# Patient Record
Sex: MALE | Race: WHITE | NOT HISPANIC OR LATINO | Employment: FULL TIME | URBAN - METROPOLITAN AREA
[De-identification: names, ages, dates, MRNs, and addresses within clinical notes are randomized per-mention and may not be internally consistent; named-entity substitution may affect disease eponyms.]

---

## 2024-03-25 ENCOUNTER — OFFICE VISIT (OUTPATIENT)
Dept: SPORTS MEDICINE | Facility: CLINIC | Age: 49
End: 2024-03-25
Payer: COMMERCIAL

## 2024-03-25 ENCOUNTER — HOSPITAL ENCOUNTER (OUTPATIENT)
Dept: RADIOLOGY | Facility: HOSPITAL | Age: 49
Discharge: HOME | End: 2024-03-25
Attending: ORTHOPAEDIC SURGERY
Payer: COMMERCIAL

## 2024-03-25 VITALS — BODY MASS INDEX: 30.31 KG/M2 | WEIGHT: 200 LBS | HEIGHT: 68 IN

## 2024-03-25 DIAGNOSIS — M25.461 EFFUSION OF BOTH KNEE JOINTS: ICD-10-CM

## 2024-03-25 DIAGNOSIS — M17.0 BILATERAL PRIMARY OSTEOARTHRITIS OF KNEE: Primary | ICD-10-CM

## 2024-03-25 DIAGNOSIS — M25.562 ACUTE PAIN OF BOTH KNEES: ICD-10-CM

## 2024-03-25 DIAGNOSIS — M25.562 PAIN IN BOTH KNEES, UNSPECIFIED CHRONICITY: ICD-10-CM

## 2024-03-25 DIAGNOSIS — M25.462 EFFUSION OF BOTH KNEE JOINTS: ICD-10-CM

## 2024-03-25 DIAGNOSIS — M25.561 PAIN IN BOTH KNEES, UNSPECIFIED CHRONICITY: ICD-10-CM

## 2024-03-25 DIAGNOSIS — M25.561 ACUTE PAIN OF BOTH KNEES: ICD-10-CM

## 2024-03-25 PROCEDURE — 99203 OFFICE O/P NEW LOW 30 MIN: CPT | Mod: 25,S$GLB,, | Performed by: ORTHOPAEDIC SURGERY

## 2024-03-25 PROCEDURE — 20610 DRAIN/INJ JOINT/BURSA W/O US: CPT | Mod: 50,S$GLB,, | Performed by: ORTHOPAEDIC SURGERY

## 2024-03-25 PROCEDURE — 3008F BODY MASS INDEX DOCD: CPT | Mod: CPTII,S$GLB,, | Performed by: ORTHOPAEDIC SURGERY

## 2024-03-25 PROCEDURE — 73564 X-RAY EXAM KNEE 4 OR MORE: CPT | Mod: TC,50,PN

## 2024-03-25 PROCEDURE — 99999 PR PBB SHADOW E&M-NEW PATIENT-LVL III: CPT | Mod: PBBFAC,,, | Performed by: ORTHOPAEDIC SURGERY

## 2024-03-25 PROCEDURE — 73564 X-RAY EXAM KNEE 4 OR MORE: CPT | Mod: 26,50,, | Performed by: RADIOLOGY

## 2024-03-25 RX ORDER — NAPROXEN 500 MG/1
500 TABLET ORAL 2 TIMES DAILY
Qty: 60 TABLET | Refills: 2 | Status: SHIPPED | OUTPATIENT
Start: 2024-03-25

## 2024-03-25 RX ADMIN — METHYLPREDNISOLONE ACETATE 80 MG: 80 INJECTION, SUSPENSION INTRA-ARTICULAR; INTRALESIONAL; INTRAMUSCULAR; SOFT TISSUE at 10:03

## 2024-03-25 NOTE — PROCEDURES
Large Joint Aspiration/Injection: bilateral knee    Date/Time: 3/25/2024 10:30 AM    Performed by: Dedrick Villanueva MD  Authorized by: Dedrick Villanueva MD    Consent Done?:  Yes (Verbal)  Indications:  Joint swelling and pain  Site marked: the procedure site was marked    Timeout: prior to procedure the correct patient, procedure, and site was verified    Prep: patient was prepped and draped in usual sterile fashion      Local anesthesia used?: Yes    Anesthesia:  Local infiltration  Local anesthetic:  Bupivacaine 0.5% without epinephrine, lidocaine 1% without epinephrine and topical anesthetic    Details:  Needle Size:  22 G  Ultrasonic Guidance for needle placement?: No    Approach:  Superior  Location:  Knee  Laterality:  Bilateral  Site:  Bilateral knee  Medications (Right):  80 mg methylPREDNISolone acetate 80 mg/mL  Medications (Left):  80 mg methylPREDNISolone acetate 80 mg/mL  Patient tolerance:  Patient tolerated the procedure well with no immediate complications     Bilateral knee injections:  2cc 1% lidocaine plain, 2cc 0.5% marcaine plain, 0.5cc 80mg methylprednisolone    Procedure Note:  We discussed the risk and benefits of injections, including pain, infection, bleeding, damage to adjacent structures, risk of reaction to injection. We discussed the steroid/cortisone injections will not heal the problem but mat help decrease inflammation and help with symptoms. We discussed the risk of repeated injections. The patient expressed understanding and wanted to proceed with the injection. We performed a timeout to verify the proper patient, proper procedure, and the proper site. The injection site was prepared in a sterile fashion. The patient tolerated it well and there were no complication. We did discuss with the patient that steroid injections can cause some increase in blood sugar and blood pressure for up to a week after the injection.

## 2024-03-25 NOTE — PROGRESS NOTES
Patient ID: Brady Leggett  YOB: 1975  MRN: [id number]    Chief Complaint: Pain of the Left Knee and Pain of the Right Knee    Referred By: Leann, previously seen Dr. 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 He reports the pain began after a  basketball game last Tuesday on 3/19/24. He denies any specific injury at the time, but symptoms onset after. He does have popping/instability in his knees. He reports the right knee is worst than his left. He reports anterior knee pain and swelling. He takes over the counter NSAIDS as needed for the pain. He has not had any physical therapy at this time. He reports his knees did bother him prior to this event but after the basketball his knees made his worst.   HPI    Past Medical History:   No past medical history on file.  No past surgical history on file.  Family History   Problem Relation Age of Onset    No Known Problems Mother     No Known Problems Father      Social History     Socioeconomic History    Marital status: Single   Tobacco Use    Smoking status: Never    Smokeless tobacco: Never       Review of patient's allergies indicates:  No Known Allergies  ROS    Physical Exam:   Body mass index is 30.41 kg/m².  There were no vitals filed for this visit.   GENERAL: Well appearing, appropriate for stated age, no acute distress.  CARDIOVASCULAR: Pulses regular by peripheral palpation.  PULMONARY: Respirations are even and non-labored.  NEURO: Awake, alert, and oriented x 3.  PSYCH: Mood & affect are appropriate.  HEENT: Head is normocephalic and atraumatic.            Right Knee Exam     Inspection   Effusion: present    Tenderness   The patient is tender to palpation of the medial joint  line.    Range of Motion   Extension:  0   Flexion:  120     Tests   Ligament Examination   Lachman: normal (-1 to 2mm)   MCL - Valgus: normal (0 to 2mm)  LCL - Varus: normal    Comments:  Moderate effusion     Left Knee Exam     Inspection   Effusion: present    Tenderness   The patient is experiencing no tenderness.     Range of Motion   Extension:  0   Flexion:  110     Tests   Stability   Lachman: normal (-1 to 2mm)   MCL - Valgus: normal (0 to 2mm)  LCL - Varus: normal (0 to 2mm)    Comments:  Mild effusion    Muscle Strength   Right Lower Extremity   Hip Abduction: 5/5   Quadriceps:  5/5   Hamstrin/5   Left Lower Extremity   Hip Abduction: 5/5   Quadriceps:  5/5   Hamstrin/5         Imaging:    No image results found.      Relevant imaging results reviewed and interpreted by me, discussed with the patient and / or family today.     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    Acute pain of both knees Yes    Bilateral primary osteoarthritis of knee     Effusion of both knee joints       Plan:  Bilateral knee CSI 40  Naproxen 500 BID as needed for pain, do not take with other NSAIDS  Order PT at [facility] - 2-3x per week for 6-8 weeks  Discussed possible ordering of visco at next visit  Although there is not a cure for arthritis, there are effective ways to improve symptoms.     Exercise & Activity:  I recommend low impact activities such as elliptical and bicycle   Walking is great for arthritis: https://www.Illumagear.com/3-reasons-walking-with-knee-arthritis/  If walking long distances, I recommend good quality well-cushioned shoes. Varsity sports can help you find the right ones:  https://www.varsityrunning.com/  Aquatic and pool therapy is often helpful because it lessens the impact on the joint, strengthens the leg and thigh muscles, and helps to control swelling.   Knee motion is important to the health of the knee.     Knee Braces:  A compression knee sleeve can help limit swelling and provide proprioceptive feedback.     Prescriptions & Medications:  I do recommend formal physical therapy or at minimum a home exercise program.   Over the counter analgesic (pain-relieving) medications can help. Examples are Tylenol, Ibuprofen, and Aleve. Check with your primary care physician to make sure you don't have contra-indications to taking those medicines.  Some over the counter supplement solutions such as glucosamine and chondroitin may help with symptoms, although the evidence is mixed.    Healthy Lifestyle:  Excess body weight can have a negative impact on joint health and on pain. I recommend healthy lifestyle choices including nutrition and exercise that help reach and maintain an ideal body weight. Tips for Exercise: https://www.[employer].Picitup/13-exercise-tips-for-a-healthier-you/  Some diets cause increased inflammation. I recommend a balanced wholesome diet including some foods such as olives that are shown to decrease inflammation. More diet information available here: https://www.[employer]uroDerm.Picitup/8-best-foods-for-knee-arthritis/      Follow-up: 6 weeks or sooner if there are any problems between now and then.    Leave Review:   Google: Leave Google Review  Healthgrades: Leave Healthgrades Review    After Hours Number: [phone]       Provider Note/Medical Decision Making:       I discussed worrisome and red flag signs and symptoms with the patient. The patient expressed understanding and agreed to alert me immediately or to go to the emergency room if they experience any of these.   Treatment plan was developed with input from the patient/family, and they expressed  understanding and agreement with the plan. All questions were answered today.          Dedrick Villanueva MD  Orthopaedic Surgery & Sports Medicine       Disclaimer: This note was prepared using a voice recognition system and is likely to have sound alike errors within the text.     I, Claritza Womack, acted as a scribe for Dedrick Villanueva MD for the duration of this office visit.

## 2024-03-25 NOTE — PATIENT INSTRUCTIONS
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    Acute pain of both knees     Bilateral primary osteoarthritis of knee Yes    Effusion of both knee joints       Plan:  Bilateral knee CSI 2/2/40  Naproxen 500 BID as needed for pain, do not take with other NSAIDS  Order PT at [facility] - 2-3x per week for 6-8 weeks  Discussed possible ordering of visco at next visit  Although there is not a cure for arthritis, there are effective ways to improve symptoms.     Exercise & Activity:  I recommend low impact activities such as elliptical and bicycle   Walking is great for arthritis: https://www.[employer].com/3-reasons-walking-with-knee-arthritis/  If walking long distances, I recommend good quality well-cushioned shoes. Varsity sports can help you find the right ones: https://[email or web address] [email or web address].Quipper/  Aquatic and pool therapy is often helpful because it lessens the impact on the joint, strengthens the leg and thigh muscles, and helps to control swelling.   Knee motion is important to the health of the knee.     Knee Braces:  A compression knee sleeve can help limit swelling and provide proprioceptive feedback.     Prescriptions & Medications:  I do recommend formal physical therapy or at minimum a home exercise program.   Over the counter analgesic (pain-relieving) medications can help. Examples are Tylenol, Ibuprofen, and Aleve. Check with your primary care physician to make sure you don't have contra-indications to taking those medicines.  Some over the counter supplement solutions such as glucosamine and chondroitin may help with symptoms, although the evidence is mixed.    Healthy Lifestyle:  Excess body weight can have a negative impact on joint health  and on pain. I recommend healthy lifestyle choices including nutrition and exercise that help reach and maintain an ideal body weight. Tips for Exercise: https://www.[employer]/13-exercise-tips-for-a-healthier-you/  Some diets cause increased inflammation. I recommend a balanced wholesome diet including some foods such as olives that are shown to decrease inflammation. More diet information available here: https://www.[employer]/8-best-foods-for-knee-arthritis/      Follow-up: 6 weeks or sooner if there are any problems between now and then.    Leave Review:   Google: Leave Google Review  Healthgrades: Leave Healthgrades Review    After Hours Number: [phone]

## 2024-03-28 RX ORDER — METHYLPREDNISOLONE ACETATE 80 MG/ML
80 INJECTION, SUSPENSION INTRA-ARTICULAR; INTRALESIONAL; INTRAMUSCULAR; SOFT TISSUE
Status: DISCONTINUED | OUTPATIENT
Start: 2024-03-25 | End: 2024-03-28 | Stop reason: HOSPADM

## 2024-04-01 ENCOUNTER — CLINICAL SUPPORT (OUTPATIENT)
Facility: HOSPITAL | Age: 49
End: 2024-04-01
Payer: COMMERCIAL

## 2024-04-01 DIAGNOSIS — M25.561 CHRONIC PAIN OF BOTH KNEES: Primary | ICD-10-CM

## 2024-04-01 DIAGNOSIS — M25.462 EFFUSION OF BOTH KNEE JOINTS: ICD-10-CM

## 2024-04-01 DIAGNOSIS — M62.551 MUSCLE WASTING AND ATROPHY, NOT ELSEWHERE CLASSIFIED, RIGHT THIGH: ICD-10-CM

## 2024-04-01 DIAGNOSIS — M17.0 BILATERAL PRIMARY OSTEOARTHRITIS OF KNEE: ICD-10-CM

## 2024-04-01 DIAGNOSIS — M25.661 DECREASED RANGE OF MOTION OF RIGHT KNEE: ICD-10-CM

## 2024-04-01 DIAGNOSIS — G89.29 CHRONIC PAIN OF BOTH KNEES: Primary | ICD-10-CM

## 2024-04-01 DIAGNOSIS — M25.562 CHRONIC PAIN OF BOTH KNEES: Primary | ICD-10-CM

## 2024-04-01 DIAGNOSIS — M25.461 EFFUSION OF BOTH KNEE JOINTS: ICD-10-CM

## 2024-04-01 PROCEDURE — 97110 THERAPEUTIC EXERCISES: CPT | Mod: PN

## 2024-04-01 PROCEDURE — 97161 PT EVAL LOW COMPLEX 20 MIN: CPT | Mod: PN

## 2024-04-01 NOTE — PLAN OF CARE
[facility]   Physical Therapy Initial Evaluation        Date: 4/1/2024   Name: Brady Leggett  Clinic Number: [id number]    Therapy Diagnosis:    Encounter Diagnoses   Name Primary?    Bilateral primary osteoarthritis of knee     Effusion of both knee joints     Chronic pain of both knees Yes    Decreased range of motion of right knee     Muscle wasting and atrophy, not elsewhere classified, right thigh       Physician: Andreia Nicole, *     Physician Orders: PT Eval and Treat  Medical Diagnosis from Referral: Bilateral Primary Osteoarthritis of Knee; Effusion of both Knee Joints  Evaluation Date: 4/1/2024  Authorization Period Expiration: 12/31/24  Plan of Care Expiration: 6/24/24  Visit # / Visits authorized: 1/1  FOTO: 1/3    Progress Note Due: 5/1/24    Precautions: Standard    Time In: 3:00 pm   Time Out: 3:45 pm   Total Billable Time (timed & untimed codes): 45 minutes    SUBJECTIVE   Date of onset: chronic with recent exacerbation  History of current condition - Brady is a 49 y.o. male whom reports bilateral knee stiffness > pain and R > L. Mechanism of injury: pain started after playing basketball. Pain is better. Patient does report some instability in his knees last week but denies instability this week.     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   Current Level of Function: Patient experiences quite a bit of difficulty with their typical ADL's.     Dominant Extremity: right    Pts goals: Pt  "reported goals are to decrease overall pain levels in order to return to prior functional level.       Medical History:   No past medical history on file.    Surgical History:   Brady Leggett  has no past surgical history on file.    Medications:   Brady has a current medication list which includes the following prescription(s): naproxen.    Allergies:   Review of patient's allergies indicates:  No Known Allergies     OBJECTIVE     RANGE OF MOTION:  *denotes pain   Knee  (degrees) Right Left Goal   Knee Flexion  135* 160 160   Knee Extension 0 -5 -5         STRENGTH:  *denotes pain    L/E MMT Right Left Goal   Hip Abduction  4-/5 4-/5 5/5   Knee Extension 5/5 5/5 -   Knee Flexion 5/5 5/5 -   Hip ER 5/5 5/5 -           Functional Mobility Observations noted Goal   Squat Functional Painful  Funcitonal Nonpainful    Step Down  NT Funcitonal Nonpainful    Single Leg Stance  NT Funcitonal Nonpainful          FUNCTION:     Intake Outcome Measure for FOTO Knee Survey    Therapist reviewed FOTO scores for Brady Leggett on 4/1/2024.   FOTO report - see Media section or FOTO account episode details.    Intake Score: 69%           TREATMENT         Intervention Code  (see below chart) Date/Notes  4/1/24   SLR - Abd TE 2x10   Clamshells TE 2x10   Box Squats TE 20" box, 2x10                                                           0 minutes of Manual therapy (MT) to improve pain and ROM.  15 minutes of Therapeutic Exercise (TE) to develop strength, endurance, ROM, and flexibility.  0 minutes of Neuromuscular Re-Education (NMR)  to improve: Balance, Coordination, Kinesthetic, Sense, Proprioception, and Posture.   minutes of Therapeutic Activities (TA) to improve functional performance.      Home Exercises and Patient Education Provided    Education/Self-Care provided:   Issued HEP for lateral hip strengthening.     Written Home Exercises Provided: yes.  Exercises were reviewed and Brady was able to demonstrate them prior to " the end of the session.  Brady demonstrated good understanding of the education provided.     See EMR under Patient Instructions for exercises provided 4/1/2024.    ASSESSMENT   Brady is a 49 y.o. male referred to outpatient Physical Therapy with a medical diagnosis of Bilateral Primary Osteoarthritis of Knee; Effusion of both Knee Joints. Pt presents with impairments including: impairments list: ROM, strength, and functional movement patterns.    Pt prognosis is Excellent.   Pt will benefit from skilled outpatient Physical Therapy to address the deficits stated above and in the chart below, provide pt/family education, and to maximize pt's level of independence.     Plan of care discussed with patient: Yes  Pt's spiritual, cultural and educational needs considered and patient is agreeable to the plan of care and goals as stated below:     Anticipated Barriers for therapy: none    Medical Necessity is demonstrated by the following  History  Co-morbidities and personal factors that may impact the plan of care [x] LOW: no personal factors / co-morbidities  [] MODERATE: 1-2 personal factors / co-morbidities  [] HIGH: 3+ personal factors / co-morbidities    Moderate / High Support Documentation: See Past Medical History     Examination  Body Structures and Functions, activity limitations and participation restrictions that may impact the plan of care [] LOW: addressing 1-2 elements  [x] MODERATE: 3+ elements  [] HIGH: 4+ elements (please support below)    Moderate / High Support Documentation: See Evaluation Above     Clinical Presentation [] LOW: stable  [x] MODERATE: Evolving  [] HIGH: Unstable     Decision Making/ Complexity Score: low         GOALS:    Short Term Goals:  6 weeks Progress      Patient will report decreased pain to <5/10 at worst on VAS to progress toward Prior Level of Function.    Patient will report improved function by score of >75 out of 100 on FOTO.    Patient will improve AROM to 50% of stated  goals in order to progress towards Prior Level of Function.    Patient will improve strength to 50% of stated goals in order to progress towards Prior Level of Function.      Long Term Goals:  12 weeks Progress     Patient will report decreased pain to <3/10 at worst on VAS to progress toward Prior Level of Function.      Patient will report improved function by a score of >90 out of 100 on FOTO.    Patient will improve ROM and Functional Mobility to stated goals to return to Prior Level of Function.    Patient will improve strength to stated goals in order to return to Prior Level of Function.         PLAN   Plan of care Certification: 4/1/2024 to 6/24/24     Outpatient Physical Therapy 3 times weekly for 12 weeks to include any combination of the following interventions: virtual visits, dry needling, modalities, electrical stimulation (IFC, Pre-Mod, Attended with Functional Dry Needling), Cervical/Lumbar Traction, Gait Training, Manual Therapy, Neuromuscular Re-ed, Patient Education, Self Care, Therapeutic Activites, and Therapeutic Exercise     Thank you for this referral.    These services are reasonable and necessary for the conditions set forth above while under my care.    Rod Burkett, PT, DPT, SCS

## 2024-04-05 ENCOUNTER — CLINICAL SUPPORT (OUTPATIENT)
Facility: HOSPITAL | Age: 49
End: 2024-04-05
Payer: COMMERCIAL

## 2024-04-05 DIAGNOSIS — M25.661 DECREASED RANGE OF MOTION OF RIGHT KNEE: ICD-10-CM

## 2024-04-05 DIAGNOSIS — M62.551 MUSCLE WASTING AND ATROPHY, NOT ELSEWHERE CLASSIFIED, RIGHT THIGH: ICD-10-CM

## 2024-04-05 DIAGNOSIS — M25.561 CHRONIC PAIN OF BOTH KNEES: Primary | ICD-10-CM

## 2024-04-05 DIAGNOSIS — G89.29 CHRONIC PAIN OF BOTH KNEES: Primary | ICD-10-CM

## 2024-04-05 DIAGNOSIS — M25.562 CHRONIC PAIN OF BOTH KNEES: Primary | ICD-10-CM

## 2024-04-05 PROCEDURE — 97112 NEUROMUSCULAR REEDUCATION: CPT | Mod: PN

## 2024-04-05 PROCEDURE — 97110 THERAPEUTIC EXERCISES: CPT | Mod: PN

## 2024-04-05 PROCEDURE — 97140 MANUAL THERAPY 1/> REGIONS: CPT | Mod: PN

## 2024-04-05 NOTE — PROGRESS NOTES
"[facility]   Physical Therapy Treatment Note     Name: Brady Leggett  Clinic Number: [id number]    Therapy Diagnosis:   Encounter Diagnoses   Name Primary?    Chronic pain of both knees Yes    Decreased range of motion of right knee     Muscle wasting and atrophy, not elsewhere classified, right thigh      Physician: Andreia Nicole, *    Visit Date: 4/5/2024    Physician Orders: PT Eval and Treat  Medical Diagnosis from Referral: Bilateral Primary Osteoarthritis of Knee; Effusion of both Knee Joints  Evaluation Date: 4/1/2024  Authorization Period Expiration: 12/31/24  Plan of Care Expiration: 6/24/24  Visit # / Visits authorized: 1/20  FOTO: 1/3    Progress Note Due: 5/1/24    Precautions: Standard    Time In: 7:00 am   Time Out: 8:05 am  Total Billable Time: 60 minutes    SUBJECTIVE     Pt reports: continued stiffness more than pain in his knees, R>L.  He was compliant with home exercise program.  Response to previous treatment: demonstrated understanding of HEP.   Functional change: independent with HEP.     Pain:  Current 2/10, worst 7/10  Location: anterior knees    OBJECTIVE     Objective Measures updated at progress report unless specified.     Treatment     Brady received the treatments listed below:        Intervention Code  (see below chart) Date/Notes  4/5/24   Tib-Fem Mobs MT A-P: 30"x3  P-A: 30"x3   Physiologic Mobs MT Flexion: 30"x3  Extension: 30"x3   Upright Bike  TE 5'   Calf Stretch TE Wedge: 30"x3   Hamstring Stretch TE Seated: 30"x3   Prone Quad Stretch TE 30"x3   SLR - Abd NMR 3x10   Clamshells NMR 3x10   Box Squats TE 20" box, 3x10   Step Ups TA 8" box, 2x10   Knee Ext Machine TE R: 1x10 30#; 1x10 25#   L: 2x10 35#    15 minutes of Manual therapy (MT) to improve pain and ROM.  30 minutes of Therapeutic Exercise (TE) to develop strength, endurance, ROM, and flexibility.  15 minutes of Neuromuscular Re-Education (NMR)  to improve: Balance, Coordination, " Kinesthetic, Sense, Proprioception, and Posture.   0 minutes of Therapeutic Activities (TA) to improve functional performance.      Patient Education and Home Exercises     Home Exercises Provided and Patient Education Provided     Education provided:   - reviewed for HEP.     Written Home Exercises Provided: Patient instructed to cont prior HEP. Exercises were reviewed and Brady was able to demonstrate them prior to the end of the session.  Brady demonstrated good  understanding of the education provided. See EMR under Patient Instructions for exercises provided during therapy sessions    ASSESSMENT   Response to MT: patient reported improved annita knee mobility.   Response to TE: added Calf Stretch, Hamstring Stretch, and Prone Quad Stretch to improve knee ROM. Added SL Knee Ext Machine to improve quad strength.   Response to NMR: increased sets of SLR - Abd and Clamshells to improve lateral hip motor control.   Response to TA: none      Brady Is progressing well towards his goals.   Pt prognosis is Excellent.     Pt will continue to benefit from skilled outpatient physical therapy to address the deficits listed in the problem list box on initial evaluation, provide pt/family education and to maximize pt's level of independence in the home and community environment.     Pt's spiritual, cultural and educational needs considered and pt agreeable to plan of care and goals.     Anticipated barriers to physical therapy: none    GOALS:    Short Term Goals:  6 weeks Progress      Patient will report decreased pain to <5/10 at worst on VAS to progress toward Prior Level of Function. Progressing    Patient will report improved function by score of >75 out of 100 on FOTO.    Patient will improve AROM to 50% of stated goals in order to progress towards Prior Level of Function.    Patient will improve strength to 50% of stated goals in order to progress towards Prior Level of Function.      Long Term Goals:  12 weeks Progress      Patient will report decreased pain to <3/10 at worst on VAS to progress toward Prior Level of Function.      Patient will report improved function by a score of >90 out of 100 on FOTO.    Patient will improve ROM and Functional Mobility to stated goals to return to Prior Level of Function.    Patient will improve strength to stated goals in order to return to Prior Level of Function.        PLAN   Continue Plan of Care (POC) and progress per patient tolerance. See treatment section for details on planned progressions next session.      Rod Burkett, PT, DPT, SCS

## 2024-04-09 ENCOUNTER — CLINICAL SUPPORT (OUTPATIENT)
Facility: HOSPITAL | Age: 49
End: 2024-04-09
Payer: COMMERCIAL

## 2024-04-09 DIAGNOSIS — M62.551 MUSCLE WASTING AND ATROPHY, NOT ELSEWHERE CLASSIFIED, RIGHT THIGH: ICD-10-CM

## 2024-04-09 DIAGNOSIS — M25.562 CHRONIC PAIN OF BOTH KNEES: Primary | ICD-10-CM

## 2024-04-09 DIAGNOSIS — M25.561 CHRONIC PAIN OF BOTH KNEES: Primary | ICD-10-CM

## 2024-04-09 DIAGNOSIS — G89.29 CHRONIC PAIN OF BOTH KNEES: Primary | ICD-10-CM

## 2024-04-09 DIAGNOSIS — M25.661 DECREASED RANGE OF MOTION OF RIGHT KNEE: ICD-10-CM

## 2024-04-09 PROCEDURE — 97140 MANUAL THERAPY 1/> REGIONS: CPT | Mod: PN

## 2024-04-09 PROCEDURE — 97110 THERAPEUTIC EXERCISES: CPT | Mod: PN

## 2024-04-09 PROCEDURE — 97530 THERAPEUTIC ACTIVITIES: CPT | Mod: PN

## 2024-04-09 PROCEDURE — 97112 NEUROMUSCULAR REEDUCATION: CPT | Mod: PN

## 2024-04-09 NOTE — PROGRESS NOTES
"[facility]   Physical Therapy Treatment Note     Name: Brady Leggett  Clinic Number: [id number]    Therapy Diagnosis:   Encounter Diagnoses   Name Primary?    Chronic pain of both knees Yes    Decreased range of motion of right knee     Muscle wasting and atrophy, not elsewhere classified, right thigh      Physician: Andreia Nicole, *    Visit Date: 4/9/2024    Physician Orders: PT Eval and Treat  Medical Diagnosis from Referral: Bilateral Primary Osteoarthritis of Knee; Effusion of both Knee Joints  Evaluation Date: 4/1/2024  Authorization Period Expiration: 12/31/24  Plan of Care Expiration: 6/24/24  Visit # / Visits authorized: 2/20  FOTO: 1/3    Progress Note Due: 5/1/24    Precautions: Standard    Time In: 7:30 am  Time Out: 8:26 am  Total Billable Time: 54 minutes      SUBJECTIVE     Pt reports: mild R knee stiffness, no L knee stiffness.   Patient was compliant with home exercise program.  Response to previous treatment: significantly decreased symptoms in annita knees.   Functional change: improved knee mobility and strength during all ADL's.     Pain:  Current 1/10, worst 7/10  Location: anterior knees    OBJECTIVE     Objective Measures updated at progress report unless specified.     Treatment     Brady received the treatments listed below:      Intervention Code  (see below chart) Date/Notes  4/9/24   Tib-Fem Mobs MT A-P: 30"x3  P-A: 30"x3   Physiologic Mobs MT Flexion: 30"x3  Extension: 30"x3   Upright Bike  TE 5'   Calf Stretch TE Wedge: 30"x3   Hamstring Stretch TE Seated: 30"x3   Prone Quad Stretch TE 30"x3   SLR - Abd NMR 3x10   Clamshells NMR 3x10   Box Squats TA 20" box, 3x10   Step Ups TA 8" box, 3x10   SL Knee Ext Machine TE R: 30#, 3x10   L: 35#, 3x10   Single Leg Balance NMR 30"x3 - annita   10 minutes of Manual therapy (MT) to improve pain and ROM.  20 minutes of Therapeutic Exercise (TE) to develop strength, endurance, ROM, and flexibility.  10 minutes of " Neuromuscular Re-Education (NMR)  to improve: Balance, Coordination, Kinesthetic, Sense, Proprioception, and Posture.  10 minutes of Therapeutic Activities (TA) to improve functional performance.        Patient Education and Home Exercises     Home Exercises Provided and Patient Education Provided     Education provided:   - reviewed for HEP.     Written Home Exercises Provided: Patient instructed to cont prior HEP. Exercises were reviewed and Brady was able to demonstrate them prior to the end of the session.  Brady demonstrated good  understanding of the education provided. See EMR under Patient Instructions for exercises provided during therapy sessions    ASSESSMENT   Response to MT: pt reported improved R knee joint mobility.   Response to TE: increased sets of SL Knee Ext Machine to improve quad strength.   Response to NMR: added Single Leg Balance to improve balance and proprioception.   Response to TA: increased sets of Step Ups to improve pt's ability to use stairs for ADL's.       Brady Is progressing well towards his goals.   Pt prognosis is Excellent.     Pt will continue to benefit from skilled outpatient physical therapy to address the deficits listed in the problem list box on initial evaluation, provide pt/family education and to maximize pt's level of independence in the home and community environment.     Pt's spiritual, cultural and educational needs considered and pt agreeable to plan of care and goals.     Anticipated barriers to physical therapy: none    GOALS:    Short Term Goals:  6 weeks Progress      Patient will report decreased pain to <5/10 at worst on VAS to progress toward Prior Level of Function. Progressing    Patient will report improved function by score of >75 out of 100 on FOTO.    Patient will improve AROM to 50% of stated goals in order to progress towards Prior Level of Function.    Patient will improve strength to 50% of stated goals in order to progress towards Prior Level  of Function.      Long Term Goals:  12 weeks Progress     Patient will report decreased pain to <3/10 at worst on VAS to progress toward Prior Level of Function.      Patient will report improved function by a score of >90 out of 100 on FOTO.    Patient will improve ROM and Functional Mobility to stated goals to return to Prior Level of Function.    Patient will improve strength to stated goals in order to return to Prior Level of Function.        PLAN   Continue Plan of Care (POC) and progress per patient tolerance. See treatment section for details on planned progressions next session.      Rod Burkett, PT, DPT, SCS

## 2024-04-12 ENCOUNTER — CLINICAL SUPPORT (OUTPATIENT)
Facility: HOSPITAL | Age: 49
End: 2024-04-12
Payer: COMMERCIAL

## 2024-04-12 DIAGNOSIS — M25.562 CHRONIC PAIN OF BOTH KNEES: Primary | ICD-10-CM

## 2024-04-12 DIAGNOSIS — G89.29 CHRONIC PAIN OF BOTH KNEES: Primary | ICD-10-CM

## 2024-04-12 DIAGNOSIS — M25.561 CHRONIC PAIN OF BOTH KNEES: Primary | ICD-10-CM

## 2024-04-12 DIAGNOSIS — M25.661 DECREASED RANGE OF MOTION OF RIGHT KNEE: ICD-10-CM

## 2024-04-12 DIAGNOSIS — M62.551 MUSCLE WASTING AND ATROPHY, NOT ELSEWHERE CLASSIFIED, RIGHT THIGH: ICD-10-CM

## 2024-04-12 PROCEDURE — 97110 THERAPEUTIC EXERCISES: CPT | Mod: PN

## 2024-04-12 PROCEDURE — 97140 MANUAL THERAPY 1/> REGIONS: CPT | Mod: PN

## 2024-04-12 PROCEDURE — 97530 THERAPEUTIC ACTIVITIES: CPT | Mod: PN

## 2024-04-12 PROCEDURE — 97112 NEUROMUSCULAR REEDUCATION: CPT | Mod: PN

## 2024-04-12 NOTE — PROGRESS NOTES
"[facility]   Physical Therapy Treatment Note     Name: Brady Leggett  Clinic Number: [id number]    Therapy Diagnosis:   Encounter Diagnoses   Name Primary?    Chronic pain of both knees Yes    Decreased range of motion of right knee     Muscle wasting and atrophy, not elsewhere classified, right thigh      Physician: Andreia Nicole, *    Visit Date: 4/12/2024    Physician Orders: PT Eval and Treat  Medical Diagnosis from Referral: Bilateral Primary Osteoarthritis of Knee; Effusion of both Knee Joints  Evaluation Date: 4/1/2024  Authorization Period Expiration: 12/31/24  Plan of Care Expiration: 6/24/24  Visit # / Visits authorized: 3/20  FOTO: 1/3    Progress Note Due: 5/1/24    Precautions: Standard    Time In: 7:30 am  Time Out: 8:35 am  Total Billable Time: 60 minutes      SUBJECTIVE     Pt reports: continued mild knee stiffness.   Patient was compliant with home exercise program.  Response to previous treatment: significantly decreased symptoms in annita knees.   Functional change: improved knee mobility and strength during all ADL's.     Pain:  Current 1/10, worst 7/10  Location: anterior knees    OBJECTIVE     Objective Measures updated at progress report unless specified.     Treatment     Brady received the treatments listed below:      Intervention Code  (see below chart) Date/Notes  4/12/24   Tib-Fem Mobs MT A-P: 30"x3  P-A: 30"x3   Physiologic Mobs MT Flexion: 30"x3  Extension: 30"x3   Upright Bike  TE 5'   Calf Stretch TE Wedge: 30"x3   Hamstring Stretch TE Seated: 30"x3   Prone Quad Stretch TE 30"x3   SLR - Abd NMR 3x10   Clamshells NMR Yellow loop, 3x10   Goblet Squats TA 25#, 3x10   Step Downs TA Lateral: 4" box, 2x10   SL Knee Ext Machine TE R: 40#, 3x10    L: 50#, 3x10    Single Leg Balance NMR Airex: 30"x3 - annita   10 minutes of Manual therapy (MT) to improve pain and ROM.  20 minutes of Therapeutic Exercise (TE) to develop strength, endurance, ROM, and " flexibility.  15 minutes of Neuromuscular Re-Education (NMR)  to improve: Balance, Coordination, Kinesthetic, Sense, Proprioception, and Posture.  15 minutes of Therapeutic Activities (TA) to improve functional performance.        Patient Education and Home Exercises     Home Exercises Provided and Patient Education Provided     Education provided:   - reviewed for HEP.     Written Home Exercises Provided: Patient instructed to cont prior HEP. Exercises were reviewed and Brady was able to demonstrate them prior to the end of the session.  Brady demonstrated good  understanding of the education provided. See EMR under Patient Instructions for exercises provided during therapy sessions    ASSESSMENT   Response to MT: pt reported improved annita knee joint mobility.   Response to TE: increased weight on SL Knee Ext Machine to improve quadriceps strength.   Response to NMR: added resistance to Clamshells to improve lateral hip motor control.   Response to TA: progressed Step Ups to Step Downs to improve pt's ability to use stairs for ADL's.       Brady Is progressing well towards his goals.   Pt prognosis is Excellent.     Pt will continue to benefit from skilled outpatient physical therapy to address the deficits listed in the problem list box on initial evaluation, provide pt/family education and to maximize pt's level of independence in the home and community environment.     Pt's spiritual, cultural and educational needs considered and pt agreeable to plan of care and goals.     Anticipated barriers to physical therapy: none    GOALS:    Short Term Goals:  6 weeks Progress      Patient will report decreased pain to <5/10 at worst on VAS to progress toward Prior Level of Function. Progressing    Patient will report improved function by score of >75 out of 100 on FOTO.    Patient will improve AROM to 50% of stated goals in order to progress towards Prior Level of Function.    Patient will improve strength to 50% of  stated goals in order to progress towards Prior Level of Function.      Long Term Goals:  12 weeks Progress     Patient will report decreased pain to <3/10 at worst on VAS to progress toward Prior Level of Function.      Patient will report improved function by a score of >90 out of 100 on FOTO.    Patient will improve ROM and Functional Mobility to stated goals to return to Prior Level of Function.    Patient will improve strength to stated goals in order to return to Prior Level of Function.        PLAN   Continue Plan of Care (POC) and progress per patient tolerance. See treatment section for details on planned progressions next session.      Rod Burkett, PT, DPT, SCS

## 2024-04-19 ENCOUNTER — CLINICAL SUPPORT (OUTPATIENT)
Facility: HOSPITAL | Age: 49
End: 2024-04-19
Payer: COMMERCIAL

## 2024-04-19 DIAGNOSIS — M25.561 CHRONIC PAIN OF BOTH KNEES: Primary | ICD-10-CM

## 2024-04-19 DIAGNOSIS — M25.562 CHRONIC PAIN OF BOTH KNEES: Primary | ICD-10-CM

## 2024-04-19 DIAGNOSIS — M25.661 DECREASED RANGE OF MOTION OF RIGHT KNEE: ICD-10-CM

## 2024-04-19 DIAGNOSIS — G89.29 CHRONIC PAIN OF BOTH KNEES: Primary | ICD-10-CM

## 2024-04-19 DIAGNOSIS — M62.551 MUSCLE WASTING AND ATROPHY, NOT ELSEWHERE CLASSIFIED, RIGHT THIGH: ICD-10-CM

## 2024-04-19 PROCEDURE — 97110 THERAPEUTIC EXERCISES: CPT | Mod: PN

## 2024-04-19 PROCEDURE — 97140 MANUAL THERAPY 1/> REGIONS: CPT | Mod: PN

## 2024-04-19 PROCEDURE — 97530 THERAPEUTIC ACTIVITIES: CPT | Mod: PN

## 2024-04-19 PROCEDURE — 97112 NEUROMUSCULAR REEDUCATION: CPT | Mod: PN

## 2024-04-19 NOTE — PROGRESS NOTES
"[facility]   Physical Therapy Treatment Note     Name: Bardy Leggett  Clinic Number: [id number]    Therapy Diagnosis:   Encounter Diagnoses   Name Primary?    Chronic pain of both knees Yes    Decreased range of motion of right knee     Muscle wasting and atrophy, not elsewhere classified, right thigh      Physician: Andreia Nicole, *    Visit Date: 4/19/2024    Physician Orders: PT Eval and Treat  Medical Diagnosis from Referral: Bilateral Primary Osteoarthritis of Knee; Effusion of both Knee Joints  Evaluation Date: 4/1/2024  Authorization Period Expiration: 12/31/24  Plan of Care Expiration: 6/24/24  Visit # / Visits authorized: 4/20 (+1 for prior authorization)  FOTO: 1/3    Progress Note Due: 5/1/24    Precautions: Standard    Time In: 7:30 am   Time Out: 8:30 pm   Total Billable Time: 60 minutes       SUBJECTIVE     Pt reports: mild R knee stiffness, no significant knee pain.   Patient was compliant with home exercise program.  Response to previous treatment: significantly decreased symptoms in annita knees.   Functional change: improved knee mobility and strength during all ADL's.     Pain:  Current 1/10, worst 7/10  Location: anterior knees    OBJECTIVE     Objective Measures updated at progress report unless specified.     Treatment     Brady received the treatments listed below:      Intervention Code  (see below chart) Date/Notes  4/19/24   Tib-Fem Mobs MT A-P: 30"x3  P-A: 30"x3   Physiologic Mobs MT Flexion: 30"x3  Extension: 30"x3   Astym MT Anterior RLE   Upright Bike  TE 5'   Calf Stretch TE Wedge: 30"x3   Hamstring Stretch TE Seated: 30"x3   Prone Quad Stretch TE 30"x3   SLR - Abd NMR Yellow loop, 3x10   Clamshells NMR Yellow loop, 3x10   Goblet Squats TA 30#, 3x10   Step Downs TA Lateral: 4" box, 3x10   SL Knee Ext Machine TE R: 40#, 3x10    L: 50#, 3x10    Single Leg Balance NMR Airex: 30"x3 - annita   10 minutes of Manual therapy (MT) to improve pain and ROM.  20 " minutes of Therapeutic Exercise (TE) to develop strength, endurance, ROM, and flexibility.  15 minutes of Neuromuscular Re-Education (NMR)  to improve: Balance, Coordination, Kinesthetic, Sense, Proprioception, and Posture.  15 minutes of Therapeutic Activities (TA) to improve functional performance.      Patient Education and Home Exercises     Home Exercises Provided and Patient Education Provided     Education provided:   - reviewed for HEP.     Written Home Exercises Provided: Patient instructed to cont prior HEP. Exercises were reviewed and Brady was able to demonstrate them prior to the end of the session.  Brady demonstrated good  understanding of the education provided. See EMR under Patient Instructions for exercises provided during therapy sessions    ASSESSMENT   Response to MT: pt reported improved anterior knee soft tissue mobility.   Response to TE: issued vc for technique.  Response to NMR: added resistance to SLR - Abd to improve lateral hip motor control.   Response to TA: increased weight on Goblet Squats to improve strength with squatting.      Brady Is progressing well towards his goals.   Pt prognosis is Excellent.     Pt will continue to benefit from skilled outpatient physical therapy to address the deficits listed in the problem list box on initial evaluation, provide pt/family education and to maximize pt's level of independence in the home and community environment.     Pt's spiritual, cultural and educational needs considered and pt agreeable to plan of care and goals.     Anticipated barriers to physical therapy: none    GOALS:    Short Term Goals:  6 weeks Progress      Patient will report decreased pain to <5/10 at worst on VAS to progress toward Prior Level of Function. Progressing    Patient will report improved function by score of >75 out of 100 on FOTO.    Patient will improve AROM to 50% of stated goals in order to progress towards Prior Level of Function.    Patient will improve  strength to 50% of stated goals in order to progress towards Prior Level of Function.      Long Term Goals:  12 weeks Progress     Patient will report decreased pain to <3/10 at worst on VAS to progress toward Prior Level of Function.      Patient will report improved function by a score of >90 out of 100 on FOTO.    Patient will improve ROM and Functional Mobility to stated goals to return to Prior Level of Function.    Patient will improve strength to stated goals in order to return to Prior Level of Function.        PLAN   Continue Plan of Care (POC) and progress per patient tolerance. See treatment section for details on planned progressions next session.      Rod Burkett, PT, DPT, SCS